# Patient Record
Sex: MALE | Race: BLACK OR AFRICAN AMERICAN | NOT HISPANIC OR LATINO | URBAN - METROPOLITAN AREA
[De-identification: names, ages, dates, MRNs, and addresses within clinical notes are randomized per-mention and may not be internally consistent; named-entity substitution may affect disease eponyms.]

---

## 2017-03-12 ENCOUNTER — EMERGENCY (EMERGENCY)
Facility: HOSPITAL | Age: 27
LOS: 1 days | Discharge: HOME | End: 2017-03-12
Attending: EMERGENCY MEDICINE | Admitting: EMERGENCY MEDICINE
Payer: SELF-PAY

## 2017-03-12 VITALS
RESPIRATION RATE: 16 BRPM | OXYGEN SATURATION: 100 % | HEART RATE: 85 BPM | DIASTOLIC BLOOD PRESSURE: 89 MMHG | SYSTOLIC BLOOD PRESSURE: 142 MMHG | TEMPERATURE: 99 F

## 2017-03-12 PROCEDURE — 23650 CLTX SHO DSLC W/MNPJ WO ANES: CPT | Mod: RT

## 2017-03-12 PROCEDURE — 99284 EMERGENCY DEPT VISIT MOD MDM: CPT | Mod: 25

## 2017-03-12 PROCEDURE — 73030 X-RAY EXAM OF SHOULDER: CPT | Mod: 26,RT
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
## 2017-03-12 NOTE — ED PROCEDURE NOTE - CPROC ED POST PROC CARE GUIDE1
Keep the cast/splint/dressing clean and dry./Instructed patient/caregiver to follow-up with primary care physician./Verbal/written post procedure instructions were given to patient/caregiver.

## 2017-03-12 NOTE — ED ADULT NURSE REASSESSMENT NOTE - NS ED NURSE REASSESS COMMENT FT1
Pt resting comfortably in stretcher. Rt Shoulder successfully reduced by MD Walker. MD @ bedside applying sling to Rt Arm, states pain much improved, rates as 3-4/10 currently. Pt provided DC instructions/Ortho referral follow up list and instructions on care for Rt arm. In NAD at time of DC to home w/ support person.

## 2017-03-12 NOTE — ED PROVIDER NOTE - CARE PLAN
Principal Discharge DX:	Shoulder dislocation  Instructions for follow-up, activity and diet:	The patient was given verbal and written discharge instructions. Specifically, instructions when to return to the ED and when to seek follow-up from their pcp was discussed. Any specialty follow-up was discussed, including how to make an appointment.  Instructions were discussed in simple, plain language and was understood by the patient. The patient understands that should their symptoms worsen or any new symptoms arise, they should return to the ED immediately for further evaluation.

## 2017-03-12 NOTE — ED PROVIDER NOTE - SKIN COLOR
2 linear abrasions to rt side of face, no full thickness skin breakage. No other obvious injuries/normal for race

## 2017-03-12 NOTE — ED PROCEDURE NOTE - CPROC ED POST PROC CARE GUIDE1
Keep the cast/splint/dressing clean and dry./Instructed patient/caregiver to follow-up with primary care physician./Verbal/written post procedure instructions were given to patient/caregiver./Elevate the injured extremity as instructed.

## 2017-03-12 NOTE — ED PROVIDER NOTE - PROGRESS NOTE DETAILS
Denise: shoulder dislocation repaired using Kocher method. 10cc intraarticular lidocaine applied to articular space Denise: During positioning for xray, shoulder pulled and pt and xray tech felt a pop. xray demonstrated shoulder dislocation reperformed Kocher shoulder relocation with administration of another 8cc intraarticular lidocaine. Shoulder popped back in. Repeat shoulder xray demonstrates normal positioning. Denise: shoulder dislocation repaired using Kocher method. 10cc intraarticular lidocaine applied to articular space. Sent to xray for repeat

## 2017-03-12 NOTE — ED PROVIDER NOTE - MUSCULOSKELETAL MINIMAL EXAM
pt gomez rt arm still in sling, obvious deformity, anterior dislocated humeral head palpable, pulses and sensation intact distal to dislocation. No other obvious deformities
